# Patient Record
Sex: MALE | Race: WHITE | Employment: FULL TIME | URBAN - NONMETROPOLITAN AREA
[De-identification: names, ages, dates, MRNs, and addresses within clinical notes are randomized per-mention and may not be internally consistent; named-entity substitution may affect disease eponyms.]

---

## 2018-08-14 ENCOUNTER — OFFICE VISIT (OUTPATIENT)
Dept: FAMILY MEDICINE CLINIC | Age: 53
End: 2018-08-14
Payer: COMMERCIAL

## 2018-08-14 VITALS
HEART RATE: 80 BPM | SYSTOLIC BLOOD PRESSURE: 118 MMHG | BODY MASS INDEX: 26.55 KG/M2 | DIASTOLIC BLOOD PRESSURE: 62 MMHG | TEMPERATURE: 98 F | HEIGHT: 72 IN | WEIGHT: 196 LBS

## 2018-08-14 DIAGNOSIS — H81.11 BENIGN PAROXYSMAL POSITIONAL VERTIGO OF RIGHT EAR: Primary | ICD-10-CM

## 2018-08-14 DIAGNOSIS — R53.83 FATIGUE, UNSPECIFIED TYPE: ICD-10-CM

## 2018-08-14 DIAGNOSIS — H61.23 CERUMEN DEBRIS ON TYMPANIC MEMBRANE OF BOTH EARS: ICD-10-CM

## 2018-08-14 PROCEDURE — 1036F TOBACCO NON-USER: CPT | Performed by: FAMILY MEDICINE

## 2018-08-14 PROCEDURE — 99214 OFFICE O/P EST MOD 30 MIN: CPT | Performed by: FAMILY MEDICINE

## 2018-08-14 PROCEDURE — G8427 DOCREV CUR MEDS BY ELIG CLIN: HCPCS | Performed by: FAMILY MEDICINE

## 2018-08-14 PROCEDURE — G8419 CALC BMI OUT NRM PARAM NOF/U: HCPCS | Performed by: FAMILY MEDICINE

## 2018-08-14 PROCEDURE — 3017F COLORECTAL CA SCREEN DOC REV: CPT | Performed by: FAMILY MEDICINE

## 2018-08-14 ASSESSMENT — PATIENT HEALTH QUESTIONNAIRE - PHQ9
SUM OF ALL RESPONSES TO PHQ9 QUESTIONS 1 & 2: 0
SUM OF ALL RESPONSES TO PHQ QUESTIONS 1-9: 0
SUM OF ALL RESPONSES TO PHQ QUESTIONS 1-9: 0
2. FEELING DOWN, DEPRESSED OR HOPELESS: 0
1. LITTLE INTEREST OR PLEASURE IN DOING THINGS: 0

## 2018-08-14 ASSESSMENT — ENCOUNTER SYMPTOMS: NAUSEA: 1

## 2018-08-14 NOTE — PROGRESS NOTES
[facility]  [address] [facility]  Dept: [phone]  Dept Fax: [phone]      Ute Owens is a 48 y.o. male who presents today for his medical conditions/complaints as noted below. Chief Complaint   Patient presents with    Dizziness       HPI:     HPI  Pt noting couple months of dizziness, initially noted with rolling in bed and severe imbalance. Has improved though intermittantly still recurring. Some nausea noted with it also    No fevers noted. Current Outpatient Prescriptions   Medication Sig Dispense Refill    omeprazole (PRILOSEC) 40 MG capsule Take 40 mg by mouth daily       No current facility-administered medications for this visit. No Known Allergies    Past Medical History:   Diagnosis Date    Duodenal ulcer     Peripheral vascular disease ([facility] [address]     Schatzki's ring     Umbilical hernia      Past Surgical History:   Procedure Laterality Date    UPPER GASTROINTESTINAL ENDOSCOPY  01/2015     Social History     Social History    Marital status:      Spouse name: N/A    Number of children: N/A    Years of education: N/A     Occupational History    Not on file. Social History Main Topics    Smoking status: Former Smoker    Smokeless tobacco: Never Used    Alcohol use No    Drug use: No    Sexual activity: Not on file     Other Topics Concern    Not on file     Social History Narrative    No narrative on file     Family History   Problem Relation Age of Onset    Lung Cancer Maternal Grandfather     Diabetes Father     Prostate Cancer Father     Other Son         epilepsy       Subjective:      Review of Systems   Constitutional: Positive for appetite change (when dizzy he does not eat as much) and fatigue (more than normal since dizziness started). HENT: Positive for tinnitus (once in a while). Negative for ear pain. Gastrointestinal: Positive for nausea (at times).    Neurological: Positive for dizziness and light-headedness (Some days). On and off for past couple of months, started when in bed he rolled from one side to the other, then tried to sit up and stand up, he could not balance at first, ever since he has had dizzy spells, much worse when on his right side in bed. pt reports controlling own anxiety symptoms much better than prior. Objective:     /62   Pulse 80   Temp 98 °F (36.7 °C)   Ht 6' (1.829 m)   Wt 196 lb (88.9 kg)   BMI 26.58 kg/m²     Physical Exam   Constitutional: He is oriented to person, place, and time. He appears well-developed and well-nourished. No distress. HENT:   Cerumen bilaterally 90 % occluded   Cardiovascular: Normal rate. No murmur heard. Pulmonary/Chest: Effort normal and breath sounds normal.   Musculoskeletal: He exhibits no edema. Neurological: He is alert and oriented to person, place, and time. Assessment:      Diagnosis Orders   1. Benign paroxysmal positional vertigo of right ear     2. Cerumen debris on tympanic membrane of both ears     3. Fatigue, unspecified type       No results found for this visit on 08/14/18. Plan:     No Follow-up on file. Patient Instructions   Encourage debrox daily x 2 weeks then 2 times weekly  REviewed extinction or provacative maneuvers with patient or may call for PT referral if worsens. If fatigue persisting beyond 2-3 months would consider evaluation of thyroid and blood counts. May call for TSH, CBC and testosterone evaluation if symptoms persisting. Reviewed need to increase fluids. No orders of the defined types were placed in this encounter. No orders of the defined types were placed in this encounter.      Electronically signed by Kane Rodrigues MD on 8/14/2018 at 11:37 AM

## 2018-10-15 ENCOUNTER — OFFICE VISIT (OUTPATIENT)
Dept: FAMILY MEDICINE CLINIC | Age: 53
End: 2018-10-15
Payer: COMMERCIAL

## 2018-10-15 VITALS
BODY MASS INDEX: 26.31 KG/M2 | SYSTOLIC BLOOD PRESSURE: 130 MMHG | HEART RATE: 64 BPM | WEIGHT: 194 LBS | TEMPERATURE: 98 F | DIASTOLIC BLOOD PRESSURE: 88 MMHG

## 2018-10-15 DIAGNOSIS — H61.23 BILATERAL IMPACTED CERUMEN: Primary | ICD-10-CM

## 2018-10-15 DIAGNOSIS — Z23 NEED FOR IMMUNIZATION AGAINST INFLUENZA: ICD-10-CM

## 2018-10-15 PROCEDURE — 99213 OFFICE O/P EST LOW 20 MIN: CPT | Performed by: FAMILY MEDICINE

## 2018-10-15 PROCEDURE — 1036F TOBACCO NON-USER: CPT | Performed by: FAMILY MEDICINE

## 2018-10-15 PROCEDURE — 69210 REMOVE IMPACTED EAR WAX UNI: CPT | Performed by: FAMILY MEDICINE

## 2018-10-15 PROCEDURE — G8427 DOCREV CUR MEDS BY ELIG CLIN: HCPCS | Performed by: FAMILY MEDICINE

## 2018-10-15 PROCEDURE — G8419 CALC BMI OUT NRM PARAM NOF/U: HCPCS | Performed by: FAMILY MEDICINE

## 2018-10-15 PROCEDURE — 3017F COLORECTAL CA SCREEN DOC REV: CPT | Performed by: FAMILY MEDICINE

## 2018-10-15 PROCEDURE — 90471 IMMUNIZATION ADMIN: CPT | Performed by: FAMILY MEDICINE

## 2018-10-15 PROCEDURE — 90686 IIV4 VACC NO PRSV 0.5 ML IM: CPT | Performed by: FAMILY MEDICINE

## 2018-10-15 PROCEDURE — G8482 FLU IMMUNIZE ORDER/ADMIN: HCPCS | Performed by: FAMILY MEDICINE

## 2018-10-16 ASSESSMENT — ENCOUNTER SYMPTOMS
TROUBLE SWALLOWING: 0
RESPIRATORY NEGATIVE: 1
SORE THROAT: 1
SINUS PAIN: 0

## 2018-10-16 NOTE — PROGRESS NOTES
DTaP/Tdap/Td vaccine (2 - Td) 04/22/2023    Flu vaccine  Completed       Lab Results   Component Value Date    HCT 45.7 10/28/2013    No results found for: CHOL, TRIG, HDL, LDLCHOLESTEROL    Subjective:      Review of Systems   Constitutional: Negative for activity change, fatigue and fever. HENT: Positive for congestion, hearing loss and sore throat. Negative for ear discharge, ear pain, sinus pain, tinnitus and trouble swallowing. Respiratory: Negative. Cardiovascular: Negative. Objective:     /88   Pulse 64   Temp 98 °F (36.7 °C)   Wt 194 lb (88 kg)   BMI 26.31 kg/m²     Physical Exam   Constitutional: No distress. HENT:   Left Ear: Hearing normal. Tympanic membrane is not retracted. No middle ear effusion. Ears:    Neck: Normal range of motion. Cardiovascular: Normal rate, regular rhythm, S1 normal, S2 normal and normal heart sounds. No murmur heard. Pulmonary/Chest: Effort normal and breath sounds normal. He has no wheezes. He has no rhonchi. He has no rales. Cerumen Impaction  He noticed the symptoms in the right ear. Wax was impacted and removed by  Lavage. Wax was completely Patient instructed not to use Q-Tips. Tolerated well. TMs normal.  Lr does not lateralize. air Conduction greater than bone conduction      Assessment:      Diagnosis Orders   1. Bilateral impacted cerumen     2. Need for immunization against influenza  INFLUENZA, QUADV, 3 YRS AND OLDER, IM, PF, PREFILL SYR OR SDV, 0.5ML (FLUZONE QUADV, PF)              POC Testing Results (If Applicable):  No results found for this visit on 10/15/18. Plan:         Orders Given:  Orders Placed This Encounter   Procedures    INFLUENZA, QUADV, 3 YRS AND OLDER, IM, PF, PREFILL SYR OR SDV, 0.5ML (FLUZONE QUADV, PF)     Prescriptions:    No orders of the defined types were placed in this encounter. Return if symptoms worsen or fail to improve.       Electronically signed by Cruzito Navarro MD

## 2020-10-21 ENCOUNTER — OFFICE VISIT (OUTPATIENT)
Dept: FAMILY MEDICINE CLINIC | Age: 55
End: 2020-10-21
Payer: COMMERCIAL

## 2020-10-21 VITALS
DIASTOLIC BLOOD PRESSURE: 72 MMHG | SYSTOLIC BLOOD PRESSURE: 116 MMHG | BODY MASS INDEX: 26.79 KG/M2 | HEART RATE: 84 BPM | HEIGHT: 72 IN | OXYGEN SATURATION: 98 % | WEIGHT: 197.8 LBS

## 2020-10-21 PROCEDURE — 90686 IIV4 VACC NO PRSV 0.5 ML IM: CPT | Performed by: FAMILY MEDICINE

## 2020-10-21 PROCEDURE — G8482 FLU IMMUNIZE ORDER/ADMIN: HCPCS | Performed by: FAMILY MEDICINE

## 2020-10-21 PROCEDURE — 90471 IMMUNIZATION ADMIN: CPT | Performed by: FAMILY MEDICINE

## 2020-10-21 PROCEDURE — 99396 PREV VISIT EST AGE 40-64: CPT | Performed by: FAMILY MEDICINE

## 2020-10-21 ASSESSMENT — ENCOUNTER SYMPTOMS
WHEEZING: 0
ABDOMINAL PAIN: 0
SHORTNESS OF BREATH: 0
CONSTIPATION: 0
COUGH: 0
DIARRHEA: 0

## 2020-10-21 ASSESSMENT — PATIENT HEALTH QUESTIONNAIRE - PHQ9
SUM OF ALL RESPONSES TO PHQ QUESTIONS 1-9: 0
1. LITTLE INTEREST OR PLEASURE IN DOING THINGS: 0
SUM OF ALL RESPONSES TO PHQ QUESTIONS 1-9: 0
2. FEELING DOWN, DEPRESSED OR HOPELESS: 0
SUM OF ALL RESPONSES TO PHQ QUESTIONS 1-9: 0
SUM OF ALL RESPONSES TO PHQ9 QUESTIONS 1 & 2: 0

## 2020-10-21 NOTE — PROGRESS NOTES
[address] [address]  Dept: [phone]  Dept Fax: [phone]    René Cole is a 54 y.o. male who presents today for his medical conditions/complaints as noted below. René Cole c/o of Annual Exam      HPI:     HPI  Pt reports here for wellness evaluation  Father with history of prostate cancer    Some SOB with heavy exertion following stress testing per pt. BP Readings from Last 3 Encounters:   10/21/20 116/72   10/15/18 130/88   18 118/62          (goal 120/80)    Past Medical History:   Diagnosis Date    Duodenal ulcer     Peripheral vascular disease ([facility] [address]     Schatzki's ring     Umbilical hernia       Past Surgical History:   Procedure Laterality Date    INGUINAL HERNIA REPAIR Left 2015    UPPER GASTROINTESTINAL ENDOSCOPY  2015       Family History   Problem Relation Age of Onset    Lung Cancer Maternal Grandfather     Diabetes Father     Prostate Cancer Father 66    Other Son         epilepsy       Social History     Tobacco Use    Smoking status: Former Smoker     Packs/day: 0.50     Years: 15.00     Pack years: 7.50     Last attempt to quit: 10/21/1995     Years since quittin.0    Smokeless tobacco: Never Used   Substance Use Topics    Alcohol use: No      Prior to Visit Medications    Medication Sig Taking?  Authorizing Provider   omeprazole (PRILOSEC) 40 MG capsule Take 40 mg by mouth daily Yes Historical Provider, MD     No Known Allergies    Health Maintenance   Topic Date Due    Hepatitis C screen  1965    HIV screen  1980    Lipid screen  2005    Diabetes screen  2005    Colon cancer screen colonoscopy  2015    Flu vaccine (1) 2020    Shingles Vaccine (1 of 2) 2025 (Originally 2015)    DTaP/Tdap/Td vaccine (2 - Td) 2023    Hepatitis A vaccine  Aged Out    Hepatitis B vaccine  Aged Out    Hib vaccine  Aged Out    Meningococcal (ACWY) vaccine  Aged Out    Pneumococcal 0-64 years Vaccine  Aged Out       Subjective:      Review of Systems   Constitutional: Negative for fatigue and fever. Respiratory: Negative for cough, shortness of breath and wheezing. Cardiovascular: Negative for chest pain, palpitations and leg swelling. Gastrointestinal: Negative for abdominal pain, constipation and diarrhea. Genitourinary: Negative for frequency and urgency. Neurological: Negative for dizziness and headaches. Objective:     /72   Pulse 84   Ht 6' (1.829 m)   Wt 197 lb 12.8 oz (89.7 kg)   SpO2 98%   BMI 26.83 kg/m²     Physical Exam  Vitals signs reviewed. Constitutional:       General: He is not in acute distress. Appearance: He is well-developed. HENT:      Head: Atraumatic. Eyes:      Conjunctiva/sclera: Conjunctivae normal.   Neck:      Musculoskeletal: Neck supple. Thyroid: No thyromegaly. Vascular: No carotid bruit. Cardiovascular:      Rate and Rhythm: Normal rate and regular rhythm. Heart sounds: No murmur. Pulmonary:      Effort: Pulmonary effort is normal.      Breath sounds: Normal breath sounds. Abdominal:      General: Bowel sounds are normal.      Palpations: Abdomen is soft. Musculoskeletal:         General: No swelling (BLE). Right lower leg: He exhibits no swelling. Left lower leg: He exhibits no swelling. Lymphadenopathy:      Cervical: No cervical adenopathy. Neurological:      Mental Status: He is alert and oriented to person, place, and time. Psychiatric:         Mood and Affect: Mood normal.         Thought Content: Thought content normal.         Judgment: Judgment normal.         Assessment:     1. Well adult exam    2. Need for influenza vaccination    3. Screening, lipid    4. Screening for diabetes mellitus    5. Screen for colon cancer    6.  Encounter for hepatitis C screening test for low risk patient      No results found for this visit on 10/21/20. Plan:     Orders Placed This Encounter   Procedures    Cologuard     This test is performed by an external laboratory and is used for result attachment only. Please fill out the appropriate paperwork required by the processing laboratory. See [email or web address] ([email or web address]) for further information. Standing Status:   Future     Standing Expiration Date:   10/21/2021    INFLUENZA, QUADV, 3 YRS AND OLDER, IM PF, PREFILL SYR OR SDV, 0.5ML (AFLURIA QUADV, PF)    Lipid Panel     Standing Status:   Future     Standing Expiration Date:   10/21/2021     Order Specific Question:   Is Patient Fasting?/# of Hours     Answer:   10-12    Glucose, Fasting     Standing Status:   Future     Standing Expiration Date:   10/21/2021    Hepatitis C Antibody     Standing Status:   Future     Standing Expiration Date:   10/21/2021        Patient counseled on PSA testing including pros and cons, including need to further evaluate if abnormal. Discussed treatments and outcomes with pt who prefers not to test following review      Return in about 2 years (around 10/21/2022) for well adult. There are no Patient Instructions on file for this visit. Discussed use, benefit, and side effects of prescribed medications. All patient questions answered. Pt voiced understanding. Reviewed health maintenance flu vaccine desired, reviewed Hep C, lipid, DM screen and colon cancer screening. Instructed to continue current medications, diet and exercise. Patient agreed with treatment plan. Follow up as directed.      Electronically signed by Adela Tucker MD on 10/21/2020

## 2020-10-26 ENCOUNTER — TELEPHONE (OUTPATIENT)
Dept: FAMILY MEDICINE CLINIC | Age: 55
End: 2020-10-26

## 2020-10-26 NOTE — TELEPHONE ENCOUNTER
As per note pt aware of pros and cons of testing. Order as desired. Mail  or mail test requested.   Thanks

## 2020-10-26 NOTE — TELEPHONE ENCOUNTER
Patient would like to get PSA levels checked. Duane stated his daughter told him he needed to have them checked.

## 2020-10-28 ENCOUNTER — OFFICE VISIT (OUTPATIENT)
Dept: FAMILY MEDICINE CLINIC | Age: 55
End: 2020-10-28
Payer: COMMERCIAL

## 2020-10-28 ENCOUNTER — HOSPITAL ENCOUNTER (OUTPATIENT)
Age: 55
Setting detail: SPECIMEN
Discharge: HOME | End: 2020-10-28
Payer: COMMERCIAL

## 2020-10-28 LAB — GLUCOSE FASTING: 99 MG/DL (ref 70–99)

## 2020-10-28 PROCEDURE — 80061 LIPID PANEL: CPT

## 2020-10-28 PROCEDURE — 36415 COLL VENOUS BLD VENIPUNCTURE: CPT | Performed by: FAMILY MEDICINE

## 2020-10-28 PROCEDURE — G0103 PSA SCREENING: HCPCS | Performed by: FAMILY MEDICINE

## 2020-10-28 PROCEDURE — 80061 LIPID PANEL: CPT | Performed by: FAMILY MEDICINE

## 2020-10-28 PROCEDURE — 82951 GLUCOSE TOLERANCE TEST (GTT): CPT | Performed by: FAMILY MEDICINE

## 2020-10-28 PROCEDURE — 82947 ASSAY GLUCOSE BLOOD QUANT: CPT

## 2020-10-28 PROCEDURE — G0103 PSA SCREENING: HCPCS

## 2020-10-28 PROCEDURE — 86803 HEPATITIS C AB TEST: CPT

## 2020-10-29 LAB
CHOLESTEROL/HDL RATIO: 5.3
CHOLESTEROL: 206 MG/DL
HDLC SERPL-MCNC: 39 MG/DL
HEPATITIS C ANTIBODY: NONREACTIVE
LDL CHOLESTEROL: 142 MG/DL (ref 0–130)
PROSTATE SPECIFIC ANTIGEN: 1.91 UG/L
TRIGL SERPL-MCNC: 123 MG/DL
VLDLC SERPL CALC-MCNC: ABNORMAL MG/DL (ref 1–30)

## 2020-11-11 ENCOUNTER — OFFICE VISIT (OUTPATIENT)
Dept: FAMILY MEDICINE CLINIC | Age: 55
End: 2020-11-11
Payer: COMMERCIAL

## 2020-11-11 VITALS
HEART RATE: 61 BPM | WEIGHT: 200 LBS | DIASTOLIC BLOOD PRESSURE: 64 MMHG | OXYGEN SATURATION: 98 % | HEIGHT: 72 IN | TEMPERATURE: 97.4 F | BODY MASS INDEX: 27.09 KG/M2 | SYSTOLIC BLOOD PRESSURE: 118 MMHG

## 2020-11-11 PROBLEM — E78.2 MIXED HYPERLIPIDEMIA: Status: ACTIVE | Noted: 2020-11-11

## 2020-11-11 PROBLEM — E78.6 HDL DEFICIENCY: Status: ACTIVE | Noted: 2020-11-11

## 2020-11-11 PROCEDURE — 99212 OFFICE O/P EST SF 10 MIN: CPT | Performed by: FAMILY MEDICINE

## 2020-11-11 PROCEDURE — G8427 DOCREV CUR MEDS BY ELIG CLIN: HCPCS | Performed by: FAMILY MEDICINE

## 2020-11-11 PROCEDURE — 1036F TOBACCO NON-USER: CPT | Performed by: FAMILY MEDICINE

## 2020-11-11 PROCEDURE — G8482 FLU IMMUNIZE ORDER/ADMIN: HCPCS | Performed by: FAMILY MEDICINE

## 2020-11-11 PROCEDURE — G8419 CALC BMI OUT NRM PARAM NOF/U: HCPCS | Performed by: FAMILY MEDICINE

## 2020-11-11 PROCEDURE — 3017F COLORECTAL CA SCREEN DOC REV: CPT | Performed by: FAMILY MEDICINE

## 2020-11-11 ASSESSMENT — ENCOUNTER SYMPTOMS
COUGH: 0
SINUS PAIN: 0
VOMITING: 0
SHORTNESS OF BREATH: 0
ABDOMINAL PAIN: 0
NAUSEA: 0

## 2020-11-11 NOTE — PATIENT INSTRUCTIONS
trans fat is fine.)  · Replace red meat with fish, poultry, and soy protein (like tofu). · Limit processed and packaged foods like chips, crackers, and cookies. · Bake, broil, or steam foods. Don't malave them. · Be physically active. Get at least 30 minutes of exercise on most days of the week. Walking is a good choice. You also may want to do other activities, such as running, swimming, cycling, or playing tennis or team sports. · Stay at a healthy weight or lose weight by making the changes in eating and physical activity listed above. Losing just a small amount of weight, even 5 to 10 pounds, can reduce your risk for having a heart attack or stroke. · Do not smoke. When should you call for help? Watch closely for changes in your health, and be sure to contact your doctor if:    · You need help making lifestyle changes.     · You have questions about your medicine. Where can you learn more? Go to https://Etopuseb.HealthiNation. org and sign in to your [employer] account. Enter I887 in the MobiPixie box to learn more about \"High Cholesterol: Care Instructions. \"     If you do not have an account, please click on the \"Sign Up Now\" link. Current as of: December 16, 2019               Content Version: 12.6  © 2176-1514 [employer]. Care instructions adapted under license by [facility] ([facility]). If you have questions about a medical condition or this instruction, always ask your healthcare professional. [place] [address] any warranty or liability for your use of this information.

## 2020-11-11 NOTE — PROGRESS NOTES
[address] [address]  Dept: [phone]  Dept Fax: [phone]    Arash Carmona is a 54 y.o. male who presents today for his medical conditions/complaints as noted below. Arash Carmona c/o of Results      HPI:     HPI  Pt here to review recent lab results, prefers to discuss prior to next follow up    BP Readings from Last 3 Encounters:   20 118/64   10/21/20 116/72   10/15/18 130/88          (goal 120/80)    Past Medical History:   Diagnosis Date    Duodenal ulcer     Peripheral vascular disease ([address]     Schatzki's ring     Umbilical hernia       Past Surgical History:   Procedure Laterality Date    INGUINAL HERNIA REPAIR Left 2015    UPPER GASTROINTESTINAL ENDOSCOPY  2015       Family History   Problem Relation Age of Onset    Lung Cancer Maternal Grandfather     Diabetes Father     Prostate Cancer Father 66    Other Son         epilepsy       Social History     Tobacco Use    Smoking status: Former Smoker     Packs/day: 0.50     Years: 15.00     Pack years: 7.50     Last attempt to quit: 10/21/1995     Years since quittin.0    Smokeless tobacco: Never Used   Substance Use Topics    Alcohol use: No      Prior to Visit Medications    Medication Sig Taking?  Authorizing Provider   omeprazole (PRILOSEC) 40 MG capsule Take 40 mg by mouth daily  Historical Provider, MD     No Known Allergies    Health Maintenance   Topic Date Due    HIV screen  1980    Shingles Vaccine (1 of 2) 2025 (Originally 2015)    DTaP/Tdap/Td vaccine (2 - Td) 2023    Diabetes screen  10/28/2023    Colon cancer screen fecal DNA test (Cologuard)  2023    Lipid screen  10/28/2025    Flu vaccine  Completed    Hepatitis C screen  Completed    Hepatitis A vaccine  Aged Out    Hepatitis B vaccine  Aged Out    Hib vaccine  Aged Out    Meningococcal (ACWY) vaccine  Aged Out    Pneumococcal 0-64 years Vaccine  Aged [place] Subjective:      Review of Systems   Constitutional: Negative for activity change, appetite change and fatigue. HENT: Negative for congestion and sinus pain. Eyes: Negative for visual disturbance. Respiratory: Negative for cough and shortness of breath. Cardiovascular: Negative for chest pain, palpitations and leg swelling. Gastrointestinal: Negative for abdominal pain, nausea and vomiting. Genitourinary: Negative for dysuria and frequency. Musculoskeletal: Negative for arthralgias and myalgias. Neurological: Negative for dizziness. Psychiatric/Behavioral: Negative for confusion and sleep disturbance. The patient is not nervous/anxious. Objective:     /64 (Site: Left Upper Arm, Position: Sitting, Cuff Size: Small Adult)   Pulse 61   Temp 97.4 °F (36.3 °C) (Temporal)   Ht 6' (1.829 m)   Wt 200 lb (90.7 kg)   SpO2 98%   BMI 27.12 kg/m²     Physical Exam  Vitals signs reviewed. Constitutional:       General: He is not in acute distress. Appearance: He is well-developed. He is not ill-appearing. HENT:      Head: Atraumatic. Eyes:      Conjunctiva/sclera: Conjunctivae normal.   Neck:      Thyroid: No thyromegaly. Cardiovascular:      Rate and Rhythm: Normal rate. Heart sounds: No murmur. Musculoskeletal:         General: No swelling (BLE). Right lower leg: He exhibits no swelling. Left lower leg: He exhibits no swelling. Neurological:      Mental Status: He is alert and oriented to person, place, and time.        Lab Results   Component Value Date    PSA 1.91 10/28/2020     Lab Results   Component Value Date    CHOL 206 (H) 10/28/2020     Lab Results   Component Value Date    TRIG 123 10/28/2020     Lab Results   Component Value Date    HDL 39 (L) 10/28/2020     Lab Results   Component Value Date    LDLCHOLESTEROL 142 (H) 10/28/2020     Lab Results   Component Value Date    VLDL NOT REPORTED 10/28/2020     Lab Results   Component Value Date CHOLHDLRATIO 5.3 (H) 10/28/2020     Glucose 99 noted  The 10-year ASCVD risk score (Virginia Neves et al., 2013) is: 6.3%    Values used to calculate the score:      Age: 54 years      Sex: Male      Is Non- : No      Diabetic: No      Tobacco smoker: No      Systolic Blood Pressure: 786 mmHg      Is BP treated: No      HDL Cholesterol: 39 mg/dL      Total Cholesterol: 206 mg/dL   cologuard negative    Assessment:     1. Mixed hyperlipidemia    2. HDL deficiency      No results found for this visit on 11/11/20. Plan:     Orders Placed This Encounter   Procedures    Lipid Panel     Standing Status:   Future     Standing Expiration Date:   11/11/2021     Order Specific Question:   Is Patient Fasting?/# of Hours     Answer:   10-12       Return for as planned annually. Patient Instructions     Patient Education   Increase exercise for elevation of good cholesterol ( HDL)  Diet changes to improve LDL (bad cholesterol)  Repeat testing in 6-12 months to reevaluate       High Cholesterol: Care Instructions  Your Care Instructions     Cholesterol is a type of fat in your blood. It is needed for many body functions, such as making new cells. Cholesterol is made by your body. It also comes from food you eat. High cholesterol means that you have too much of the fat in your blood. This raises your risk of a heart attack and stroke. LDL and HDL are part of your total cholesterol. LDL is the \"bad\" cholesterol. High LDL can raise your risk for heart disease, heart attack, and stroke. HDL is the \"good\" cholesterol. It helps clear bad cholesterol from the body. High HDL is linked with a lower risk of heart disease, heart attack, and stroke. Your cholesterol levels help your doctor find out your risk for having a heart attack or stroke. You and your doctor can talk about whether you need to lower your risk and what treatment is best for you.   A heart-healthy lifestyle along with medicines can help lower your cholesterol and your risk. The way you choose to lower your risk will depend on how high your risk is for heart attack and stroke. It will also depend on how you feel about taking medicines. Follow-up care is a key part of your treatment and safety. Be sure to make and go to all appointments, and call your doctor if you are having problems. It's also a good idea to know your test results and keep a list of the medicines you take. How can you care for yourself at home? · Eat a variety of foods every day. Good choices include fruits, vegetables, whole grains (like oatmeal), dried beans and peas, nuts and seeds, soy products (like tofu), and fat-free or low-fat dairy products. · Replace butter, margarine, and hydrogenated or partially hydrogenated oils with olive and canola oils. (Canola oil margarine without trans fat is fine.)  · Replace red meat with fish, poultry, and soy protein (like tofu). · Limit processed and packaged foods like chips, crackers, and cookies. · Bake, broil, or steam foods. Don't malave them. · Be physically active. Get at least 30 minutes of exercise on most days of the week. Walking is a good choice. You also may want to do other activities, such as running, swimming, cycling, or playing tennis or team sports. · Stay at a healthy weight or lose weight by making the changes in eating and physical activity listed above. Losing just a small amount of weight, even 5 to 10 pounds, can reduce your risk for having a heart attack or stroke. · Do not smoke. When should you call for help? Watch closely for changes in your health, and be sure to contact your doctor if:    · You need help making lifestyle changes.     · You have questions about your medicine. Where can you learn more? Go to https://chpepiceweb.Mi-Pay. org and sign in to your [email or web address] account. Enter Y125 in the Spotster box to learn more about \"High Cholesterol: Care Instructions. \"     If you do not have an account, please click on the \"Sign Up Now\" link. Current as of: December 16, 2019               Content Version: 12.6  © 3060-0583 [employer]. Care instructions adapted under license by [facility] ([facility]). If you have questions about a medical condition or this instruction, always ask your healthcare professional. [place] [address] any warranty or liability for your use of this information. Discussed use, benefit, and side effects of prescribed medications. All patient questions answered. Pt voiced understanding. Instructed to continue current medications, diet and exercise. Patient agreed with treatment plan. Follow up as directed.      Electronically signed by Talha Araujo MD on 11/11/2020

## 2021-05-04 ENCOUNTER — OFFICE VISIT (OUTPATIENT)
Dept: FAMILY MEDICINE CLINIC | Age: 56
End: 2021-05-04
Payer: COMMERCIAL

## 2021-05-04 VITALS
DIASTOLIC BLOOD PRESSURE: 82 MMHG | TEMPERATURE: 98.3 F | SYSTOLIC BLOOD PRESSURE: 128 MMHG | WEIGHT: 196.7 LBS | OXYGEN SATURATION: 98 % | BODY MASS INDEX: 26.68 KG/M2 | HEART RATE: 88 BPM

## 2021-05-04 DIAGNOSIS — M25.562 ACUTE PAIN OF LEFT KNEE: Primary | ICD-10-CM

## 2021-05-04 PROCEDURE — 3017F COLORECTAL CA SCREEN DOC REV: CPT | Performed by: FAMILY MEDICINE

## 2021-05-04 PROCEDURE — 1036F TOBACCO NON-USER: CPT | Performed by: FAMILY MEDICINE

## 2021-05-04 PROCEDURE — G8419 CALC BMI OUT NRM PARAM NOF/U: HCPCS | Performed by: FAMILY MEDICINE

## 2021-05-04 PROCEDURE — 99213 OFFICE O/P EST LOW 20 MIN: CPT | Performed by: FAMILY MEDICINE

## 2021-05-04 PROCEDURE — G8427 DOCREV CUR MEDS BY ELIG CLIN: HCPCS | Performed by: FAMILY MEDICINE

## 2021-05-04 RX ORDER — CELECOXIB 200 MG/1
200 CAPSULE ORAL DAILY
Qty: 30 CAPSULE | Refills: 1 | Status: SHIPPED | OUTPATIENT
Start: 2021-05-04 | End: 2021-05-04 | Stop reason: SDUPTHER

## 2021-05-04 RX ORDER — CELECOXIB 200 MG/1
200 CAPSULE ORAL DAILY
Qty: 30 CAPSULE | Refills: 1 | Status: SHIPPED | OUTPATIENT
Start: 2021-05-04

## 2021-05-04 ASSESSMENT — PATIENT HEALTH QUESTIONNAIRE - PHQ9
2. FEELING DOWN, DEPRESSED OR HOPELESS: 0
SUM OF ALL RESPONSES TO PHQ QUESTIONS 1-9: 0
1. LITTLE INTEREST OR PLEASURE IN DOING THINGS: 0

## 2021-05-04 NOTE — PROGRESS NOTES
[address] [facility]  Dept: [phone]  Dept Airam Mei is a 54 y.o. male who presents today for his medical conditions/complaints as noted below. Chief Complaint   Patient presents with    Knee Pain     left knee pain aching x2 months       HPI:     HPI  Pt here for evaluation of left knee pain for past 2 months. More severe past week after bending and squatting more throughout the day. Patient reports he has been having trouble with walking regularly and noticing some joint swelling. No known injury was noted initially. Had problems in sports in youth only. Patient has been taking no medications regularly to try and help with this. Rare ibuprofen without relief  No prior surgery or therapy has been attempted. Pain on inside and noting tight sensation on back on knee. Prior to Visit Medications    Medication Sig Taking?  Authorizing Provider   omeprazole (PRILOSEC) 40 MG capsule Take 40 mg by mouth daily Yes Historical Provider, MD       No Known Allergies    Past Medical History:   Diagnosis Date    Duodenal ulcer     Peripheral vascular disease ([facility] [address]     Schatzki's ring     Umbilical hernia      Past Surgical History:   Procedure Laterality Date    INGUINAL HERNIA REPAIR Left 2015    UPPER GASTROINTESTINAL ENDOSCOPY  01/2015     Social History     Socioeconomic History    Marital status:      Spouse name: Not on file    Number of children: Not on file    Years of education: Not on file    Highest education level: Not on file   Occupational History    Not on file   Social Needs    Financial resource strain: Not on file    Food insecurity     Worry: Not on file     Inability: Not on file   [employer] needs     Medical: Not on file     Non-medical: Not on file   Tobacco Use    Smoking status: Former Smoker     Packs/day: 0.50     Years: 15.00     Pack years: 7.50     Quit date: 10/21/1995     Years since quittin.5    Smokeless tobacco: Never Used   Substance and Sexual Activity    Alcohol use: No    Drug use: No    Sexual activity: Not on file   Lifestyle    Physical activity     Days per week: Not on file     Minutes per session: Not on file    Stress: Not on file   Relationships    Social connections     Talks on phone: Not on file     Gets together: Not on file     Attends [employer] service: Not on file     Active member of club or organization: Not on file     Attends meetings of clubs or organizations: Not on file     Relationship status: Not on file    Intimate partner violence     Fear of current or ex partner: Not on file     Emotionally abused: Not on file     Physically abused: Not on file     Forced sexual activity: Not on file   Other Topics Concern    Not on file   Social History Narrative    Not on file     Family History   Problem Relation Age of Onset    Lung Cancer Maternal Grandfather     Diabetes Father     Prostate Cancer Father 66    Other Son         epilepsy       Subjective:      Review of Systems   Musculoskeletal: Positive for gait problem and joint swelling. Left knee swelling/pain       Objective:     /82 (Site: Left Upper Arm, Position: Sitting)   Pulse 88   Temp 98.3 °F (36.8 °C) (Tympanic)   Wt 196 lb 11.2 oz (89.2 kg)   SpO2 98%   BMI 26.68 kg/m²     Physical Exam  Vitals signs reviewed. HENT:      Head: Normocephalic. Eyes:      Conjunctiva/sclera: Conjunctivae normal.   Cardiovascular:      Rate and Rhythm: Normal rate. Pulmonary:      Effort: Pulmonary effort is normal. No respiratory distress. Musculoskeletal:         General: Tenderness (medial left knee JLT no superior or inferior tenderness. Normal right knee) present. No deformity. Neurological:      Mental Status: He is alert. Psychiatric:         Thought Content:  Thought content normal.         Judgment: Judgment normal.         Assessment:

## 2022-06-02 ENCOUNTER — OFFICE VISIT (OUTPATIENT)
Dept: FAMILY MEDICINE CLINIC | Age: 57
End: 2022-06-02
Payer: COMMERCIAL

## 2022-06-02 VITALS
SYSTOLIC BLOOD PRESSURE: 116 MMHG | DIASTOLIC BLOOD PRESSURE: 84 MMHG | OXYGEN SATURATION: 98 % | TEMPERATURE: 99 F | RESPIRATION RATE: 14 BRPM | HEIGHT: 74 IN | BODY MASS INDEX: 24.79 KG/M2 | HEART RATE: 73 BPM | WEIGHT: 193.2 LBS

## 2022-06-02 DIAGNOSIS — J40 BRONCHITIS: Primary | ICD-10-CM

## 2022-06-02 DIAGNOSIS — H61.23 BILATERAL IMPACTED CERUMEN: ICD-10-CM

## 2022-06-02 PROCEDURE — G8427 DOCREV CUR MEDS BY ELIG CLIN: HCPCS | Performed by: NURSE PRACTITIONER

## 2022-06-02 PROCEDURE — 69209 REMOVE IMPACTED EAR WAX UNI: CPT | Performed by: NURSE PRACTITIONER

## 2022-06-02 PROCEDURE — 99213 OFFICE O/P EST LOW 20 MIN: CPT | Performed by: NURSE PRACTITIONER

## 2022-06-02 PROCEDURE — 3017F COLORECTAL CA SCREEN DOC REV: CPT | Performed by: NURSE PRACTITIONER

## 2022-06-02 PROCEDURE — G8420 CALC BMI NORM PARAMETERS: HCPCS | Performed by: NURSE PRACTITIONER

## 2022-06-02 PROCEDURE — 1036F TOBACCO NON-USER: CPT | Performed by: NURSE PRACTITIONER

## 2022-06-02 RX ORDER — ALBUTEROL SULFATE 90 UG/1
2 AEROSOL, METERED RESPIRATORY (INHALATION) EVERY 6 HOURS PRN
Qty: 18 G | Refills: 3 | Status: SHIPPED | OUTPATIENT
Start: 2022-06-02

## 2022-06-02 RX ORDER — AZITHROMYCIN 250 MG/1
TABLET, FILM COATED ORAL
Qty: 1 PACKET | Refills: 0 | Status: SHIPPED | OUTPATIENT
Start: 2022-06-02 | End: 2022-06-07

## 2022-06-02 SDOH — ECONOMIC STABILITY: FOOD INSECURITY: WITHIN THE PAST 12 MONTHS, THE FOOD YOU BOUGHT JUST DIDN'T LAST AND YOU DIDN'T HAVE MONEY TO GET MORE.: NEVER TRUE

## 2022-06-02 SDOH — ECONOMIC STABILITY: FOOD INSECURITY: WITHIN THE PAST 12 MONTHS, YOU WORRIED THAT YOUR FOOD WOULD RUN OUT BEFORE YOU GOT MONEY TO BUY MORE.: NEVER TRUE

## 2022-06-02 ASSESSMENT — PATIENT HEALTH QUESTIONNAIRE - PHQ9
2. FEELING DOWN, DEPRESSED OR HOPELESS: 0
SUM OF ALL RESPONSES TO PHQ9 QUESTIONS 1 & 2: 0
SUM OF ALL RESPONSES TO PHQ QUESTIONS 1-9: 0
1. LITTLE INTEREST OR PLEASURE IN DOING THINGS: 0
SUM OF ALL RESPONSES TO PHQ QUESTIONS 1-9: 0

## 2022-06-02 ASSESSMENT — ENCOUNTER SYMPTOMS
SORE THROAT: 0
SINUS COMPLAINT: 1
WHEEZING: 0
SINUS PRESSURE: 0
DIARRHEA: 0
RHINORRHEA: 1
NAUSEA: 0
VOMITING: 0
COUGH: 1

## 2022-06-02 ASSESSMENT — SOCIAL DETERMINANTS OF HEALTH (SDOH): HOW HARD IS IT FOR YOU TO PAY FOR THE VERY BASICS LIKE FOOD, HOUSING, MEDICAL CARE, AND HEATING?: NOT HARD AT ALL

## 2022-06-02 NOTE — PATIENT INSTRUCTIONS
Zithromax as directed. Albuterol 2 puffs every 4 to 6 hours as needed for cough, wheeze or shortness of breath. Follow up with primary care provider in 1 to 2 days if needed. Patient Education        Bronchitis: Care Instructions  Your Care Instructions     Bronchitis is inflammation of the bronchial tubes, which carry air to the lungs. The tubes swell and produce mucus, or phlegm. The mucus and inflamedbronchial tubes make you cough. You may have trouble breathing. Most cases of bronchitis are caused by viruses like those that cause colds. Antibiotics usually do not help and they may be harmful. Bronchitis usually develops rapidly and lasts about 2 to 3 weeks in otherwisehealthy people. Follow-up care is a key part of your treatment and safety. Be sure to make and go to all appointments, and call your doctor if you are having problems. It's also a good idea to know your test results and keep alist of the medicines you take. How can you care for yourself at home?  Take all medicines exactly as prescribed. Call your doctor if you think you are having a problem with your medicine.  Get some extra rest.   Take an over-the-counter pain medicine, such as acetaminophen (Tylenol), ibuprofen (Advil, Motrin), or naproxen (Aleve) to reduce fever and relieve body aches. Read and follow all instructions on the label.  Do not take two or more pain medicines at the same time unless the doctor told you to. Many pain medicines have acetaminophen, which is Tylenol. Too much acetaminophen (Tylenol) can be harmful.  Take an over-the-counter cough medicine to help quiet a dry, hacking cough so that you can sleep. Avoid cough medicines that have more than one active ingredient. Read and follow all instructions on the label.  Do not smoke. Smoking can make bronchitis worse. If you need help quitting, talk to your doctor about stop-smoking programs and medicines.  These can increase your chances of quitting for good.  When should you call for help? Call 911 anytime you think you may need emergency care. For example, call if:     You have severe trouble breathing. Call your doctor now or seek immediate medical care if:     You have new or worse trouble breathing.      You cough up dark brown or bloody mucus (sputum).      You have a new or higher fever.      You have a new rash. Watch closely for changes in your health, and be sure to contact your doctor if:     You cough more deeply or more often, especially if you notice more mucus or a change in the color of your mucus.      You are not getting better as expected. Where can you learn more? Go to https://SeevibespeRithmioeb.Astonish Results. org and sign in to your Auxmoney account. Enter H333 in the [address] box to learn more about \"Bronchitis: Care Instructions. \"     If you do not have an account, please click on the \"Sign Up Now\" link. Current as of: July 6, 2021               Content Version: 13.2  © 2006-2022 [employer]. Care instructions adapted under license by [facility] ([facility]). If you have questions about a medical condition or this instruction, always ask your healthcare professional. [place] [address] any warranty or liability for your use of this information. Patient Education        Earwax Blockage: Care Instructions  Your Care Instructions     Earwax is a natural substance that protects the ear canal. Normally, earwax drains from the ears and does not cause problems. Sometimes earwax builds up and hardens. Earwax blockage (also called cerumen impaction) can cause some loss of hearing and pain. When wax is tightly packed, you will need to haveyour doctor remove it. Follow-up care is a key part of your treatment and safety. Be sure to make and go to all appointments, and call your doctor if you are having problems.  It's also a good idea to know your test results and keep alist of the medicines you take.  How can you care for yourself at home?  Do not try to remove earwax with cotton swabs, fingers, or other objects. This can make the blockage worse and damage the eardrum.  If your doctor recommends that you try to remove earwax at home:  ? Soften and loosen the earwax with warm mineral oil. You also can try hydrogen peroxide mixed with an equal amount of room temperature water. Place 2 drops of the fluid, warmed to body temperature, in the ear two times a day for up to 5 days. ? Once the wax is loose and soft, all that is usually needed to remove it from the ear canal is a gentle, warm shower. Direct the water into the ear, then tip your head to let the earwax drain out. Dry your ear thoroughly with a hair dryer set on low. Hold the dryer several inches from your ear. ? If the warm mineral oil and shower do not work, use an over-the-counter wax softener. Read and follow all instructions on the label. After using the wax softener, use an ear syringe to gently flush the ear. Make sure the flushing solution is body temperature. Cool or hot fluids in the ear can cause dizziness. When should you call for help? Call your doctor now or seek immediate medical care if:     Pus or blood drains from your ear.      Your ears are ringing or feel full.      You have a loss of hearing. Watch closely for changes in your health, and be sure to contact your doctor if:     You have pain or reduced hearing after 1 week of home treatment.      You have any new symptoms, such as nausea or balance problems. Where can you learn more? Go to https://IntelliFlojonaEntellus Medical.Neuren Pharmaceuticals. org and sign in to your [employer] Software account. Enter O154 in the [facility] box to learn more about \"Earwax Blockage: Care Instructions. \"     If you do not have an account, please click on the \"Sign Up Now\" link. Current as of: July 1, 2021               Content Version: 13.2  © 9741-4862 Healthwise, Incorporated.    Care instructions adapted under license by [facility] ([facility]). If you have questions about a medical condition or this instruction, always ask your healthcare professional. [address] any warranty or liability for your use of this information.

## 2022-06-02 NOTE — PROGRESS NOTES
[address] [place] [address] [address] [facility], APRN-[facility]  [address]  Phone:  [phone]  Fax:  [phone]  Xander Buitrago is a 64 y.o. male who presents today for his medical conditions/complaints as noted below. Xander Buitrago c/o of Cough (Pt presents to walkin with complaints of a cold for the past week since 5/25. Pt says he has been experienicing cough, nasal congestion, and runny nose. Pt states he took an at home covid test on5/26 that came back negative. Pt has tried OTC alkaseltzer cold medicing but only provided temporary relief. )      HPI:     Sinus Problem  This is a new problem. The current episode started 1 to 4 weeks ago (8 days). The problem is unchanged. There has been no fever. Associated symptoms include congestion and coughing. Pertinent negatives include no chills, diaphoresis, headaches, sinus pressure or sore throat.        Wt Readings from Last 3 Encounters:   06/02/22 193 lb 3.2 oz (87.6 kg)   05/04/21 196 lb 11.2 oz (89.2 kg)   11/11/20 200 lb (90.7 kg)       Temp Readings from Last 3 Encounters:   06/02/22 99 °F (37.2 °C)   05/04/21 98.3 °F (36.8 °C) (Tympanic)   11/11/20 97.4 °F (36.3 °C) (Temporal)       BP Readings from Last 3 Encounters:   06/02/22 116/84   05/04/21 128/82   11/11/20 118/64       Pulse Readings from Last 3 Encounters:   06/02/22 73   05/04/21 88   11/11/20 61        SpO2 Readings from Last 3 Encounters:   06/02/22 98%   05/04/21 98%   11/11/20 98%             Past Medical History:   Diagnosis Date    Duodenal ulcer     Peripheral vascular disease ([address]     Schatzki's ring     Umbilical hernia       Past Surgical History:   Procedure Laterality Date    INGUINAL HERNIA REPAIR Left 2015    UPPER GASTROINTESTINAL ENDOSCOPY  01/2015     Family History   Problem Relation Age of Onset    Lung Cancer Maternal Grandfather     Diabetes Father     Prostate Cancer Father 66    Other Son         epilepsy     Social History     Tobacco Use  Smoking status: Former Smoker     Packs/day: 0.50     Years: 15.00     Pack years: 7.50     Quit date: 10/21/1995     Years since quittin.6    Smokeless tobacco: Never Used   Substance Use Topics    Alcohol use: No      Current Outpatient Medications   Medication Sig Dispense Refill    azithromycin (ZITHROMAX Z-KIERRA) 250 MG tablet Two pills daily first day, then one pill daily for 4 days. Take with food. 1 packet 0    albuterol sulfate HFA (VENTOLIN HFA) 108 (90 Base) MCG/ACT inhaler Inhale 2 puffs into the lungs every 6 hours as needed for Wheezing 18 g 3    celecoxib (CELEBREX) 200 MG capsule Take 1 capsule by mouth daily 30 capsule 1    omeprazole (PRILOSEC) 40 MG capsule Take 40 mg by mouth daily       No current facility-administered medications for this visit. No Known Allergies    No exam data present    Subjective:      Review of Systems   Constitutional: Negative for chills, diaphoresis, fatigue and fever. HENT: Positive for congestion and rhinorrhea. Negative for sinus pressure and sore throat. Respiratory: Positive for cough. Negative for wheezing. Gastrointestinal: Negative for diarrhea, nausea and vomiting. Musculoskeletal: Negative for arthralgias and myalgias. Neurological: Negative for dizziness and headaches. Objective:     /84 (Site: Right Upper Arm, Position: Sitting, Cuff Size: Medium Adult)   Pulse 73   Temp 99 °F (37.2 °C)   Resp 14   Ht 6' 2\" (1.88 m)   Wt 193 lb 3.2 oz (87.6 kg)   SpO2 98%   BMI 24.81 kg/m²     Physical Exam  Vitals reviewed. Constitutional:       General: He is not in acute distress. Appearance: He is well-developed. He is not ill-appearing, toxic-appearing or diaphoretic. HENT:      Head: Normocephalic. Right Ear: External ear normal. There is impacted cerumen. Left Ear: External ear normal. There is impacted cerumen. Nose: Nose normal. No mucosal edema, congestion or rhinorrhea.       Mouth/Throat: Mouth: Mucous membranes are moist. Mucous membranes are not pale and not dry. Pharynx: Oropharynx is clear. Eyes:      General: Lids are normal. No scleral icterus. Right eye: No discharge. Left eye: No discharge. Extraocular Movements:      Right eye: No nystagmus. Left eye: No nystagmus. Conjunctiva/sclera: Conjunctivae normal.   Neck:      Trachea: Trachea normal.   Cardiovascular:      Rate and Rhythm: Normal rate and regular rhythm. Pulses: Normal pulses. Heart sounds: Normal heart sounds. Pulmonary:      Effort: Pulmonary effort is normal. No accessory muscle usage or respiratory distress. Breath sounds: Examination of the right-middle field reveals rhonchi. Examination of the right-lower field reveals rhonchi. Rhonchi present. Musculoskeletal:         General: Normal range of motion. Cervical back: Full passive range of motion without pain and normal range of motion. Skin:     General: Skin is warm and dry. Capillary Refill: Capillary refill takes less than 2 seconds. Coloration: Skin is not pale. Neurological:      Mental Status: He is alert and oriented to person, place, and time. Psychiatric:         Mood and Affect: Mood normal.         Speech: Speech normal.         Behavior: Behavior normal.         Thought Content: Thought content normal.         Judgment: Judgment normal.         Assessment:      Diagnosis Orders   1. Bronchitis  azithromycin (ZITHROMAX Z-KIERRA) 250 MG tablet    albuterol sulfate HFA (VENTOLIN HFA) 108 (90 Base) MCG/ACT inhaler   2. Bilateral impacted cerumen  MN REMOVAL IMPACTED CERUMEN IRRIGATION/LVG UNILAT     No results found for this visit on 06/02/22. A large amount of cerumen was removed bilaterally via irrigation with warm water, peroxide and alcohol. Patient tolerated procedure well. Plan:       Zithromax as directed.   Albuterol 2 puffs every 4 to 6 hours as needed for cough, wheeze or shortness of breath. Follow up with primary care provider in 1 to 2 days if needed. Patient Instructions     Zithromax as directed. Albuterol 2 puffs every 4 to 6 hours as needed for cough, wheeze or shortness of breath. Follow up with primary care provider in 1 to 2 days if needed. Patient Education        Bronchitis: Care Instructions  Your Care Instructions     Bronchitis is inflammation of the bronchial tubes, which carry air to the lungs. The tubes swell and produce mucus, or phlegm. The mucus and inflamedbronchial tubes make you cough. You may have trouble breathing. Most cases of bronchitis are caused by viruses like those that cause colds. Antibiotics usually do not help and they may be harmful. Bronchitis usually develops rapidly and lasts about 2 to 3 weeks in otherwisehealthy people. Follow-up care is a key part of your treatment and safety. Be sure to make and go to all appointments, and call your doctor if you are having problems. It's also a good idea to know your test results and keep alist of the medicines you take. How can you care for yourself at home?  Take all medicines exactly as prescribed. Call your doctor if you think you are having a problem with your medicine.  Get some extra rest.   Take an over-the-counter pain medicine, such as acetaminophen (Tylenol), ibuprofen (Advil, Motrin), or naproxen (Aleve) to reduce fever and relieve body aches. Read and follow all instructions on the label.  Do not take two or more pain medicines at the same time unless the doctor told you to. Many pain medicines have acetaminophen, which is Tylenol. Too much acetaminophen (Tylenol) can be harmful.  Take an over-the-counter cough medicine to help quiet a dry, hacking cough so that you can sleep. Avoid cough medicines that have more than one active ingredient. Read and follow all instructions on the label.  Do not smoke. Smoking can make bronchitis worse.  If you need help quitting, talk to your doctor about stop-smoking programs and medicines. These can increase your chances of quitting for good. When should you call for help? Call 911 anytime you think you may need emergency care. For example, call if:     You have severe trouble breathing. Call your doctor now or seek immediate medical care if:     You have new or worse trouble breathing.      You cough up dark brown or bloody mucus (sputum).      You have a new or higher fever.      You have a new rash. Watch closely for changes in your health, and be sure to contact your doctor if:     You cough more deeply or more often, especially if you notice more mucus or a change in the color of your mucus.      You are not getting better as expected. Where can you learn more? Go to [email or web address]://[email or web address].Cabe na Mala. org and sign in to your [employer] account. Enter H333 in the [employer] (T/A) box to learn more about \"Bronchitis: Care Instructions. \"     If you do not have an account, please click on the \"Sign Up Now\" link. Current as of: July 6, 2021               Content Version: 13.2  © 5581-8394 [employer] Power. Care instructions adapted under license by [facility] ([facility]). If you have questions about a medical condition or this instruction, always ask your healthcare professional. [place] [address] any warranty or liability for your use of this information. Patient Education        Earwax Blockage: Care Instructions  Your Care Instructions     Earwax is a natural substance that protects the ear canal. Normally, earwax drains from the ears and does not cause problems. Sometimes earwax builds up and hardens. Earwax blockage (also called cerumen impaction) can cause some loss of hearing and pain. When wax is tightly packed, you will need to haveyour doctor remove it. Follow-up care is a key part of your treatment and safety.  Be sure to make and go to all appointments, and call your doctor if you are having problems. It's also a good idea to know your test results and keep alist of the medicines you take. How can you care for yourself at home?  Do not try to remove earwax with cotton swabs, fingers, or other objects. This can make the blockage worse and damage the eardrum.  If your doctor recommends that you try to remove earwax at home:  ? Soften and loosen the earwax with warm mineral oil. You also can try hydrogen peroxide mixed with an equal amount of room temperature water. Place 2 drops of the fluid, warmed to body temperature, in the ear two times a day for up to 5 days. ? Once the wax is loose and soft, all that is usually needed to remove it from the ear canal is a gentle, warm shower. Direct the water into the ear, then tip your head to let the earwax drain out. Dry your ear thoroughly with a hair dryer set on low. Hold the dryer several inches from your ear. ? If the warm mineral oil and shower do not work, use an over-the-counter wax softener. Read and follow all instructions on the label. After using the wax softener, use an ear syringe to gently flush the ear. Make sure the flushing solution is body temperature. Cool or hot fluids in the ear can cause dizziness. When should you call for help? Call your doctor now or seek immediate medical care if:     Pus or blood drains from your ear.      Your ears are ringing or feel full.      You have a loss of hearing. Watch closely for changes in your health, and be sure to contact your doctor if:     You have pain or reduced hearing after 1 week of home treatment.      You have any new symptoms, such as nausea or balance problems. Where can you learn more? Go to https://AdInnovationpeandiGigParkeb.Dynamaxx Mfg. org and sign in to your Avvenu account. Enter Y166 in the [employer]yet box to learn more about \"Earwax Blockage: Care Instructions. \"     If you do not have an account, please click on the \"Sign Up Now\" link.   Current as of: July 1, 2021               Content Version: 13.2  © 4999-4847 Healthwise, Incorporated. Care instructions adapted under license by [facility] ([facility]). If you have questions about a medical condition or this instruction, always ask your healthcare professional. [place] [address] any warranty or liability for your use of this information. Patient/Caregiver instructed on use, benefit, and side effects of prescribed medications. All patient/parent/caregiver questions answered. Patient/parent/caregiver voiced understanding. Reviewed health maintenance. Instructed to continue current medications, diet and exercise. Patient agreed with treatment plan. Follow up as directed.            Electronically signed by MARIO Lake  [id number]

## 2022-06-03 DIAGNOSIS — J40 BRONCHITIS: Primary | ICD-10-CM

## 2022-06-03 RX ORDER — BENZONATATE 200 MG/1
200 CAPSULE ORAL 3 TIMES DAILY PRN
Qty: 30 CAPSULE | Refills: 0 | Status: SHIPPED | OUTPATIENT
Start: 2022-06-03 | End: 2022-06-13

## 2024-02-05 ENCOUNTER — TELEPHONE (OUTPATIENT)
Dept: FAMILY MEDICINE CLINIC | Age: 59
End: 2024-02-05

## 2024-02-13 SDOH — HEALTH STABILITY: PHYSICAL HEALTH: ON AVERAGE, HOW MANY DAYS PER WEEK DO YOU ENGAGE IN MODERATE TO STRENUOUS EXERCISE (LIKE A BRISK WALK)?: 3 DAYS

## 2024-02-13 SDOH — HEALTH STABILITY: PHYSICAL HEALTH: ON AVERAGE, HOW MANY MINUTES DO YOU ENGAGE IN EXERCISE AT THIS LEVEL?: 40 MIN

## 2024-02-14 ENCOUNTER — OFFICE VISIT (OUTPATIENT)
Dept: FAMILY MEDICINE CLINIC | Age: 59
End: 2024-02-14
Payer: COMMERCIAL

## 2024-02-14 VITALS
HEART RATE: 88 BPM | SYSTOLIC BLOOD PRESSURE: 126 MMHG | WEIGHT: 206 LBS | HEIGHT: 74 IN | BODY MASS INDEX: 26.44 KG/M2 | DIASTOLIC BLOOD PRESSURE: 78 MMHG | OXYGEN SATURATION: 100 %

## 2024-02-14 DIAGNOSIS — G47.9 SLEEP DISTURBANCE: ICD-10-CM

## 2024-02-14 DIAGNOSIS — M70.52 PATELLAR BURSITIS, LEFT: ICD-10-CM

## 2024-02-14 DIAGNOSIS — R53.82 CHRONIC FATIGUE: ICD-10-CM

## 2024-02-14 DIAGNOSIS — R13.19 ESOPHAGEAL DYSPHAGIA: Primary | ICD-10-CM

## 2024-02-14 DIAGNOSIS — Z12.5 SCREENING FOR PROSTATE CANCER: ICD-10-CM

## 2024-02-14 DIAGNOSIS — K22.2 ESOPHAGEAL STENOSIS: ICD-10-CM

## 2024-02-14 DIAGNOSIS — Z13.220 SCREENING, LIPID: ICD-10-CM

## 2024-02-14 DIAGNOSIS — Z51.81 MEDICATION MONITORING ENCOUNTER: ICD-10-CM

## 2024-02-14 DIAGNOSIS — Z12.11 SCREEN FOR COLON CANCER: ICD-10-CM

## 2024-02-14 DIAGNOSIS — Z23 NEED FOR DIPHTHERIA-TETANUS-PERTUSSIS (TDAP) VACCINE: ICD-10-CM

## 2024-02-14 DIAGNOSIS — Z13.1 SCREENING FOR DIABETES MELLITUS: ICD-10-CM

## 2024-02-14 PROCEDURE — 99204 OFFICE O/P NEW MOD 45 MIN: CPT | Performed by: FAMILY MEDICINE

## 2024-02-14 PROCEDURE — 90471 IMMUNIZATION ADMIN: CPT | Performed by: FAMILY MEDICINE

## 2024-02-14 PROCEDURE — 3017F COLORECTAL CA SCREEN DOC REV: CPT | Performed by: FAMILY MEDICINE

## 2024-02-14 PROCEDURE — 1036F TOBACCO NON-USER: CPT | Performed by: FAMILY MEDICINE

## 2024-02-14 PROCEDURE — G8427 DOCREV CUR MEDS BY ELIG CLIN: HCPCS | Performed by: FAMILY MEDICINE

## 2024-02-14 PROCEDURE — 90715 TDAP VACCINE 7 YRS/> IM: CPT | Performed by: FAMILY MEDICINE

## 2024-02-14 PROCEDURE — G8484 FLU IMMUNIZE NO ADMIN: HCPCS | Performed by: FAMILY MEDICINE

## 2024-02-14 PROCEDURE — G8419 CALC BMI OUT NRM PARAM NOF/U: HCPCS | Performed by: FAMILY MEDICINE

## 2024-02-14 RX ORDER — TRAZODONE HYDROCHLORIDE 50 MG/1
50 TABLET ORAL NIGHTLY
Qty: 30 TABLET | Refills: 1 | Status: SHIPPED | OUTPATIENT
Start: 2024-02-14

## 2024-02-14 SDOH — ECONOMIC STABILITY: HOUSING INSECURITY
IN THE LAST 12 MONTHS, WAS THERE A TIME WHEN YOU DID NOT HAVE A STEADY PLACE TO SLEEP OR SLEPT IN A SHELTER (INCLUDING NOW)?: NO

## 2024-02-14 SDOH — ECONOMIC STABILITY: FOOD INSECURITY: WITHIN THE PAST 12 MONTHS, YOU WORRIED THAT YOUR FOOD WOULD RUN OUT BEFORE YOU GOT MONEY TO BUY MORE.: NEVER TRUE

## 2024-02-14 SDOH — ECONOMIC STABILITY: FOOD INSECURITY: WITHIN THE PAST 12 MONTHS, THE FOOD YOU BOUGHT JUST DIDN'T LAST AND YOU DIDN'T HAVE MONEY TO GET MORE.: NEVER TRUE

## 2024-02-14 SDOH — ECONOMIC STABILITY: INCOME INSECURITY: HOW HARD IS IT FOR YOU TO PAY FOR THE VERY BASICS LIKE FOOD, HOUSING, MEDICAL CARE, AND HEATING?: NOT HARD AT ALL

## 2024-02-14 ASSESSMENT — PATIENT HEALTH QUESTIONNAIRE - PHQ9
SUM OF ALL RESPONSES TO PHQ QUESTIONS 1-9: 0
SUM OF ALL RESPONSES TO PHQ9 QUESTIONS 1 & 2: 0
SUM OF ALL RESPONSES TO PHQ QUESTIONS 1-9: 0
2. FEELING DOWN, DEPRESSED OR HOPELESS: 0
SUM OF ALL RESPONSES TO PHQ QUESTIONS 1-9: 0
SUM OF ALL RESPONSES TO PHQ QUESTIONS 1-9: 0
1. LITTLE INTEREST OR PLEASURE IN DOING THINGS: 0

## 2024-02-14 NOTE — PROGRESS NOTES
[facility] [place]  [address], [address]  [place] [address]  Dept: [phone]  Dept Fax:[phone]    Brandon Salazar is a 58 y.o. male who presents today for his medical conditions/complaints as notedbelow.  Brandon Salazar is c/o of New Patient and Congestion (Pt states he has been fighting congestion for a few weeks )      Assessment/Plan:     1. Esophageal dysphagia  With previous history of stenosis and his current recurrent symptoms will refer for EGD and possible redilation.  In the interim avoid medications that can increase acid reflux small bites with fluids frequent sips.  - [facility], [place]    2. Esophageal stenosis    - [facility], [place]    3. Patellar bursitis, left  Minimal symptoms at this time.  Avoid activities that aggravate such as direct pressure on the knee.  Reviewed signs and symptoms of infection that would prompt reevaluation and urgent treatment.    4. Sleep disturbance  Sleep hygiene reviewed.  Reviewed over-the-counter treatment options.  Will trial trazodone initially 1 tab may increase to 2 if needed.  Could consider SSRI treatment if not effective.  - traZODone (DESYREL) 50 MG tablet; Take 1 tablet by mouth nightly  Dispense: 30 tablet; Refill: 1    5. Screen for colon cancer    - [facility], [place]    6. Screening for diabetes mellitus      - Glucose, Fasting; Future    7. Screening for prostate cancer    - PSA Screening; Future    8. Screening, lipid    - Lipid Panel; Future    9. Need for diphtheria-tetanus-pertussis (Tdap) vaccine    - Tdap, BOOSTRIX, (age 10 yrs+), IM    10. Chronic fatigue    - CBC with Auto Differential; Future  - TSH With Reflex Ft4; Future    11. Medication monitoring encounter    - Vitamin B12; Future        Lab Results   Component Value Date    WBC 8.9 10/28/2013    HGB 15.7 10/28/2013    HCT 45.7 10/28/2013     10/28/2013

## 2024-02-14 NOTE — PROGRESS NOTES
After obtaining consent, and per orders of Dr. Harvey, injection of TDAP given in Left deltoid by Lexy Bloom MA. Patient tolerated well.  Patient instructed to remain in clinic for 20 minutes afterwards, and to report any adverse reaction immediately.

## 2024-03-02 LAB
BASOPHILS %: 1.38 (ref 0–3)
BASOPHILS ABSOLUTE: 0.09 (ref 0–0.3)
CHOLESTEROL/HDL RATIO: 5.95 RATIO (ref 0–4.5)
CHOLESTEROL: 226 MG/DL (ref 50–200)
DIAGNOSTIC PSA: 3.57 NG/ML (ref 0–4)
EOSINOPHILS %: 1.18 (ref 0–10)
EOSINOPHILS ABSOLUTE: 0.08 (ref 0–1.1)
GLUCOSE: 104 MG/DL (ref 75–110)
HCT VFR BLD CALC: 48.1 % (ref 42–52)
HDLC SERPL-MCNC: 38 MG/DL (ref 36–68)
HEMOGLOBIN: 16.6 (ref 13.8–17.8)
LDL CHOLESTEROL CALCULATED: 160.6 MG/DL (ref 0–160)
LYMPHOCYTE %: 29.05 (ref 20–51.1)
LYMPHOCYTES ABSOLUTE: 1.99 (ref 1–5.5)
MCH RBC QN AUTO: 30.9 PG (ref 28.5–32.5)
MCHC RBC AUTO-ENTMCNC: 34.5 G/DL (ref 32–37)
MCV RBC AUTO: 89.4 FL (ref 80–94)
MONOCYTES %: 9.35 (ref 1.7–9.3)
MONOCYTES ABSOLUTE: 0.64 (ref 0.1–1)
NEUTROPHILS %: 59.05 (ref 42.2–75.2)
NEUTROPHILS ABSOLUTE: 4.04 (ref 2–8.1)
PDW BLD-RTO: 11.4 % (ref 10–15.5)
PLATELET # BLD: 336.7 THOU/MM3 (ref 130–400)
RBC: 5.38 M/UL (ref 4.7–6.1)
TRIGL SERPL-MCNC: 137 MG/DL (ref 10–250)
TSH REFLEX FT4: 0.87 MIU/ML (ref 0.49–4.67)
VITAMIN B-12: 621 PG/ML (ref 239–931)
VLDLC SERPL CALC-MCNC: 27 MG/DL (ref 0–50)
WBC: 6.8 THOU/ML3 (ref 4.8–10.8)

## 2024-03-12 ENCOUNTER — INITIAL CONSULT (OUTPATIENT)
Dept: SURGERY | Age: 59
End: 2024-03-12
Payer: COMMERCIAL

## 2024-03-12 VITALS
SYSTOLIC BLOOD PRESSURE: 128 MMHG | HEART RATE: 71 BPM | BODY MASS INDEX: 26.13 KG/M2 | OXYGEN SATURATION: 97 % | DIASTOLIC BLOOD PRESSURE: 84 MMHG | HEIGHT: 74 IN | WEIGHT: 203.6 LBS

## 2024-03-12 DIAGNOSIS — R13.19 ESOPHAGEAL DYSPHAGIA: Primary | ICD-10-CM

## 2024-03-12 DIAGNOSIS — Z87.19 HISTORY OF ESOPHAGEAL STRICTURE: ICD-10-CM

## 2024-03-12 DIAGNOSIS — Z12.11 COLON CANCER SCREENING: ICD-10-CM

## 2024-03-12 PROCEDURE — 99214 OFFICE O/P EST MOD 30 MIN: CPT | Performed by: SURGERY

## 2024-03-12 PROCEDURE — 3017F COLORECTAL CA SCREEN DOC REV: CPT | Performed by: SURGERY

## 2024-03-12 PROCEDURE — 1036F TOBACCO NON-USER: CPT | Performed by: SURGERY

## 2024-03-12 PROCEDURE — G8484 FLU IMMUNIZE NO ADMIN: HCPCS | Performed by: SURGERY

## 2024-03-12 PROCEDURE — G8419 CALC BMI OUT NRM PARAM NOF/U: HCPCS | Performed by: SURGERY

## 2024-03-12 PROCEDURE — G8427 DOCREV CUR MEDS BY ELIG CLIN: HCPCS | Performed by: SURGERY

## 2024-03-12 RX ORDER — OMEGA-3/DHA/EPA/FISH OIL 60 MG-90MG
1 CAPSULE ORAL DAILY
COMMUNITY

## 2024-03-12 NOTE — PROGRESS NOTES
Subjective   Brandon Salazar is a 58 y.o. male who presents today for further evaluation for possible EGD and dilation of esophagus.  Patient has a history of esophageal stricture and in 2015 had to undergo EGD with dilation.  He did well but then in 2021 I actually saw him at [facility] for impacted food in the esophagus.  This was removed and he was started on therapy and has not had any significant issues.  However he states that more recently has begun to notice that sometimes he will feel food getting stuck in the lower esophagus.  This usually passes quickly or is able to get it to pass with liquid intake.  Does still get some reflux symptoms.  Is on daily PPI therapy and it keeps him under control but he states that certain times he will still have breakthrough symptoms.  Was eating a fairly higher fat diet but has started to change his diet recently due to high cholesterol and states that that has improved symptoms some.  Recently saw his PCP back and because of the ongoing symptoms and the concern for possible stricturing and he was referred to general surgery.  Denies any emesis.  No hematemesis.    Patient is also due for colon cancer screening.  Had a Cologuard test 3 years ago which was negative.  When he recently saw his PCP was recommended to consider colonoscopy for his repeat screening.  Denies any family history of colon cancer.  No recent changes in bowel movements.  No other complaints.    Past Medical History:   Diagnosis Date    Anxiety 2015    Duodenal ulcer     GERD (gastroesophageal reflux disease)     Few years ago    Hearing loss     Few years ago    Peripheral vascular disease (HCC)     Restless legs syndrome     At least 10 years    Schatzki's ring     Umbilical hernia        Past Surgical History:   Procedure Laterality Date    INGUINAL HERNIA REPAIR Left 2015    UPPER GASTROINTESTINAL ENDOSCOPY N/A 01/2015       Current Outpatient Medications   Medication Sig Dispense Refill

## 2024-03-12 NOTE — ASSESSMENT & PLAN NOTE
Patient's last colon cancer screening was 3 years ago with [facility] which means he would be due for repeat screening.  Have recommended colonoscopy as it is the gold standard for colon cancer screening.  Risks of the procedure including bleeding, infection, perforation, need for the surgery and anesthesia risk are discussed and consent is obtained.  Will use MiraLAX prep at patient request.  Instructions reviewed.

## 2024-03-12 NOTE — ASSESSMENT & PLAN NOTE
Patient does have current dysphagia and history of Schatzki's ring which required dilation in the past.  Continues to have some breakthrough reflux despite medical therapy.  Going to plan for EGD with possible dilation to further investigate and treat.  Risks of the procedure including bleeding, infection, perforation, need for the surgery and anesthesia risk are discussed and consent is obtained.

## 2024-03-27 ENCOUNTER — ANESTHESIA EVENT (OUTPATIENT)
Dept: OPERATING ROOM | Age: 59
End: 2024-03-27
Payer: COMMERCIAL

## 2024-03-27 ENCOUNTER — HOSPITAL ENCOUNTER (OUTPATIENT)
Age: 59
Setting detail: OUTPATIENT SURGERY
Discharge: HOME | End: 2024-03-27
Attending: SURGERY | Admitting: SURGERY
Payer: COMMERCIAL

## 2024-03-27 ENCOUNTER — ANESTHESIA (OUTPATIENT)
Dept: OPERATING ROOM | Age: 59
End: 2024-03-27
Payer: COMMERCIAL

## 2024-03-27 VITALS
OXYGEN SATURATION: 97 % | DIASTOLIC BLOOD PRESSURE: 56 MMHG | TEMPERATURE: 97.4 F | WEIGHT: 189.2 LBS | HEIGHT: 74 IN | RESPIRATION RATE: 16 BRPM | HEART RATE: 48 BPM | BODY MASS INDEX: 24.28 KG/M2 | SYSTOLIC BLOOD PRESSURE: 94 MMHG

## 2024-03-27 DIAGNOSIS — Z87.19 HISTORY OF ESOPHAGEAL STRICTURE: ICD-10-CM

## 2024-03-27 DIAGNOSIS — R13.19 ESOPHAGEAL DYSPHAGIA: ICD-10-CM

## 2024-03-27 DIAGNOSIS — Z12.11 COLON CANCER SCREENING: ICD-10-CM

## 2024-03-27 LAB — GLUCOSE BLD-MCNC: 93 MG/DL (ref 75–110)

## 2024-03-27 PROCEDURE — 82947 ASSAY GLUCOSE BLOOD QUANT: CPT

## 2024-03-27 PROCEDURE — 3700000001 HC ADD 15 MINUTES (ANESTHESIA): Performed by: SURGERY

## 2024-03-27 PROCEDURE — 7100000011 HC PHASE II RECOVERY - ADDTL 15 MIN: Performed by: SURGERY

## 2024-03-27 PROCEDURE — 3609012400 HC EGD TRANSORAL BIOPSY SINGLE/MULTIPLE: Performed by: SURGERY

## 2024-03-27 PROCEDURE — 3700000000 HC ANESTHESIA ATTENDED CARE: Performed by: SURGERY

## 2024-03-27 PROCEDURE — 3609027000 HC COLONOSCOPY: Performed by: SURGERY

## 2024-03-27 PROCEDURE — 7100000010 HC PHASE II RECOVERY - FIRST 15 MIN: Performed by: SURGERY

## 2024-03-27 PROCEDURE — 6360000002 HC RX W HCPCS

## 2024-03-27 PROCEDURE — 2580000003 HC RX 258: Performed by: SURGERY

## 2024-03-27 PROCEDURE — 2709999900 HC NON-CHARGEABLE SUPPLY: Performed by: SURGERY

## 2024-03-27 PROCEDURE — 88305 TISSUE EXAM BY PATHOLOGIST: CPT

## 2024-03-27 PROCEDURE — 2500000003 HC RX 250 WO HCPCS

## 2024-03-27 RX ORDER — SODIUM CHLORIDE 9 MG/ML
INJECTION, SOLUTION INTRAVENOUS PRN
Status: DISCONTINUED | OUTPATIENT
Start: 2024-03-27 | End: 2024-03-27 | Stop reason: HOSPADM

## 2024-03-27 RX ORDER — PROPOFOL 10 MG/ML
INJECTION, EMULSION INTRAVENOUS PRN
Status: DISCONTINUED | OUTPATIENT
Start: 2024-03-27 | End: 2024-03-27 | Stop reason: SDUPTHER

## 2024-03-27 RX ORDER — SODIUM CHLORIDE, SODIUM LACTATE, POTASSIUM CHLORIDE, CALCIUM CHLORIDE 600; 310; 30; 20 MG/100ML; MG/100ML; MG/100ML; MG/100ML
INJECTION, SOLUTION INTRAVENOUS CONTINUOUS
Status: DISCONTINUED | OUTPATIENT
Start: 2024-03-27 | End: 2024-03-27 | Stop reason: HOSPADM

## 2024-03-27 RX ORDER — LIDOCAINE HYDROCHLORIDE 40 MG/ML
INJECTION, SOLUTION RETROBULBAR PRN
Status: DISCONTINUED | OUTPATIENT
Start: 2024-03-27 | End: 2024-03-27 | Stop reason: SDUPTHER

## 2024-03-27 RX ORDER — DEXMEDETOMIDINE HYDROCHLORIDE 100 UG/ML
INJECTION, SOLUTION INTRAVENOUS PRN
Status: DISCONTINUED | OUTPATIENT
Start: 2024-03-27 | End: 2024-03-27 | Stop reason: SDUPTHER

## 2024-03-27 RX ORDER — SODIUM CHLORIDE 0.9 % (FLUSH) 0.9 %
5-40 SYRINGE (ML) INJECTION EVERY 12 HOURS SCHEDULED
Status: DISCONTINUED | OUTPATIENT
Start: 2024-03-27 | End: 2024-03-27 | Stop reason: HOSPADM

## 2024-03-27 RX ORDER — SODIUM CHLORIDE 0.9 % (FLUSH) 0.9 %
5-40 SYRINGE (ML) INJECTION PRN
Status: DISCONTINUED | OUTPATIENT
Start: 2024-03-27 | End: 2024-03-27 | Stop reason: HOSPADM

## 2024-03-27 RX ADMIN — PHENYLEPHRINE HYDROCHLORIDE 100 MCG: 10 INJECTION INTRAVENOUS at 09:43

## 2024-03-27 RX ADMIN — SODIUM CHLORIDE, POTASSIUM CHLORIDE, SODIUM LACTATE AND CALCIUM CHLORIDE: 600; 310; 30; 20 INJECTION, SOLUTION INTRAVENOUS at 09:07

## 2024-03-27 RX ADMIN — PHENYLEPHRINE HYDROCHLORIDE 100 MCG: 10 INJECTION INTRAVENOUS at 09:35

## 2024-03-27 RX ADMIN — LIDOCAINE HYDROCHLORIDE 100 MG: 40 INJECTION, SOLUTION RETROBULBAR; TOPICAL at 09:18

## 2024-03-27 RX ADMIN — PROPOFOL 180 MCG/KG/MIN: 10 INJECTION, EMULSION INTRAVENOUS at 09:19

## 2024-03-27 RX ADMIN — DEXMEDETOMIDINE HYDROCHLORIDE 15 MCG: 100 INJECTION, SOLUTION INTRAVENOUS at 09:17

## 2024-03-27 RX ADMIN — PROPOFOL 150 MG: 10 INJECTION, EMULSION INTRAVENOUS at 09:18

## 2024-03-27 ASSESSMENT — LIFESTYLE VARIABLES: SMOKING_STATUS: 0

## 2024-03-27 ASSESSMENT — PAIN SCALES - GENERAL
PAINLEVEL_OUTOF10: 0

## 2024-03-27 ASSESSMENT — PAIN - FUNCTIONAL ASSESSMENT
PAIN_FUNCTIONAL_ASSESSMENT: ADULT NONVERBAL PAIN SCALE (NPVS)
PAIN_FUNCTIONAL_ASSESSMENT: 0-10

## 2024-03-27 NOTE — PROCEDURES
[facility]                [address] [place] [address]                             PROCEDURE NOTE      PATIENT NAME: FLORENCE GONZALEZ                 : 1965  MED REC NO: [id number]                         ROOM: [facility]  ACCOUNT NO: [id number]                       ADMIT DATE: 2024  PROVIDER: Arian Miles DO      DATE OF PROCEDURE:  2024    SURGEON:  Arian Miles DO    ASSISTANT:  None.    PREOPERATIVE DIAGNOSES:    1. Dysphagia.  2. Screening for colon cancer.    POSTOPERATIVE DIAGNOSES:    1. Dysphagia.  2. Screening for colon cancer.  3. Diverticulosis.    PROCEDURES:    1. Esophagogastroduodenoscopy with biopsy.  2. Colonoscopy.    ANESTHESIA:  MAC.    ESTIMATED BLOOD LOSS:  Minimal.    FLUIDS:  Per Anesthesia record.    COMPLICATIONS:  None.    SPECIMENS:  Biopsy in the antrum.    INDICATION FOR PROCEDURE:  The patient is a 58-year-old gentleman who was referred to my office for the above preoperative diagnoses.  He has had a stricture in the esophagus in the past, which required dilation and I was concerned this may have recurred.  After I saw him in the office, decision was made to proceed with EGD and colonoscopy.  Prior to the time of the procedure, risks, benefits, and alternatives were explained to the patient.  Consent was obtained.    DESCRIPTION OF PROCEDURE:  The patient was brought to the endoscopy suite, kept on preop gurney, placed in left lateral decubitus position.  Monitoring devices were placed.  MAC anesthesia was induced.  After induction of anesthesia, a time-out was performed, and correct patient and procedure were verified.  Bite block was placed.  The Olympus video endoscope was lubricated and inserted into the patient's oropharynx and directed in the esophagus.  Scope was easily advanced down the esophagus into the lumen of stomach.  During advancement, there was no notice of any stricturing or scarring in the

## 2024-03-27 NOTE — H&P
current dysphagia and history of Schatzki's ring which required dilation in the past.  Continues to have some breakthrough reflux despite medical therapy.  Going to plan for EGD with possible dilation to further investigate and treat.  Risks of the procedure including bleeding, infection, perforation, need for the surgery and anesthesia risk are discussed and consent is obtained.     2. History of esophageal stricture  3. Colon cancer screening  Assessment & Plan:  Patient's last colon cancer screening was 3 years ago with Cologuard which means he would be due for repeat screening.  Have recommended colonoscopy as it is the gold standard for colon cancer screening.  Risks of the procedure including bleeding, infection, perforation, need for the surgery and anesthesia risk are discussed and consent is obtained.  Will use MiraLAX prep at patient request.  Instructions reviewed.           (Please note that portions of this note were completed with a voice recognition program.  Efforts were made to edit the dictations but occasionally words are mis-transcribed.)    The patient was interviewed and examined and there have been no changes since the above History and Physical.    Electronically signed by Arian Miles DO on 3/26/2024 at 8:07 PM

## 2024-03-27 NOTE — ANESTHESIA POSTPROCEDURE EVALUATION
Department of Anesthesiology  Postprocedure Note    Patient: Brandon Salazar  MRN: [id number]  YOB: 1965  Date of evaluation: 3/27/2024    Procedure Summary       Date: 03/27/24 Room / Location: [facility]    Anesthesia Start: 0917 Anesthesia Stop: 0952    Procedures:       ESOPHAGOGASTRODUODENOSCOPY BIOPSY (Esophagus)      COLONOSCOPY (Anus) Diagnosis:       Esophageal dysphagia      History of esophageal stricture      Colon cancer screening      (Esophageal dysphagia [R13.19])      (History of esophageal stricture [Z87.19])      (Colon cancer screening [Z12.11])    Surgeons: Arian Miles DO Responsible Provider: Miguel Painting APRN - CRNA    Anesthesia Type: General, TIVA ASA Status: 2            Anesthesia Type: General, TIVA    Eric Phase I: Eric Score: 10    Eric Phase II:      Anesthesia Post Evaluation    Patient location during evaluation: bedside  Patient participation: complete - patient participated  Level of consciousness: sleepy but conscious  Airway patency: patent  Nausea & Vomiting: no nausea  Cardiovascular status: hemodynamically stable  Respiratory status: nasal cannula  Hydration status: euvolemic  Pain management: satisfactory to patient    No notable events documented.

## 2024-03-27 NOTE — ANESTHESIA PRE PROCEDURE
Department of Anesthesiology  Preprocedure Note       Name:  Brandon Salazar   Age:  58 y.o.  :  1965                                          MRN:  [id number]         Date:  3/27/2024      Surgeon: Surgeon(s):  Arian Miles DO    Procedure: Procedure(s):  EGD with possible dilatation  COLONOSCOPY    Medications prior to admission:   Prior to Admission medications    Medication Sig Start Date End Date Taking? Authorizing Provider   Omega-3 Fatty Acids (FISH OIL) 500 MG CAPS Take 500 mg by mouth daily    ProviderSaul MD   traZODone (DESYREL) 50 MG tablet Take 1 tablet by mouth nightly  Patient not taking: Reported on 3/12/2024 2/14/24   Cece Alcocer MD   omeprazole 20 MG EC tablet Take 1 tablet by mouth daily    ProviderSaul MD       Current medications:    No current facility-administered medications for this encounter.     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   • Mixed hyperlipidemia E78.2   • Esophageal dysphagia R13.19   • History of esophageal stricture Z87.19   • Colon cancer screening Z12.11       Past Medical History:        Diagnosis Date   • Anxiety    • Duodenal ulcer    • GERD (gastroesophageal reflux disease)     Few years ago   • Hearing loss     Few years ago   • Peripheral vascular disease (HCC)    • Restless legs syndrome     At least 10 years   • Schatzki's ring    • Umbilical hernia        Past Surgical History:        Procedure Laterality Date   • INGUINAL HERNIA REPAIR Left    • UPPER GASTROINTESTINAL ENDOSCOPY N/A 2015       Social History:

## 2024-03-29 LAB — SURGICAL PATHOLOGY REPORT: NORMAL

## 2024-04-11 PROBLEM — Z12.11 COLON CANCER SCREENING: Status: RESOLVED | Noted: 2024-03-12 | Resolved: 2024-04-11

## 2025-02-19 SDOH — ECONOMIC STABILITY: INCOME INSECURITY: IN THE LAST 12 MONTHS, WAS THERE A TIME WHEN YOU WERE NOT ABLE TO PAY THE MORTGAGE OR RENT ON TIME?: NO

## 2025-02-19 SDOH — ECONOMIC STABILITY: FOOD INSECURITY: WITHIN THE PAST 12 MONTHS, YOU WORRIED THAT YOUR FOOD WOULD RUN OUT BEFORE YOU GOT MONEY TO BUY MORE.: NEVER TRUE

## 2025-02-19 SDOH — ECONOMIC STABILITY: TRANSPORTATION INSECURITY
IN THE PAST 12 MONTHS, HAS THE LACK OF TRANSPORTATION KEPT YOU FROM MEDICAL APPOINTMENTS OR FROM GETTING MEDICATIONS?: NO

## 2025-02-19 SDOH — ECONOMIC STABILITY: FOOD INSECURITY: WITHIN THE PAST 12 MONTHS, THE FOOD YOU BOUGHT JUST DIDN'T LAST AND YOU DIDN'T HAVE MONEY TO GET MORE.: NEVER TRUE

## 2025-02-19 SDOH — ECONOMIC STABILITY: TRANSPORTATION INSECURITY
IN THE PAST 12 MONTHS, HAS LACK OF TRANSPORTATION KEPT YOU FROM MEETINGS, WORK, OR FROM GETTING THINGS NEEDED FOR DAILY LIVING?: NO

## 2025-02-19 ASSESSMENT — PATIENT HEALTH QUESTIONNAIRE - PHQ9
1. LITTLE INTEREST OR PLEASURE IN DOING THINGS: NOT AT ALL
SUM OF ALL RESPONSES TO PHQ QUESTIONS 1-9: 0
SUM OF ALL RESPONSES TO PHQ QUESTIONS 1-9: 0
SUM OF ALL RESPONSES TO PHQ9 QUESTIONS 1 & 2: 0
2. FEELING DOWN, DEPRESSED OR HOPELESS: NOT AT ALL
SUM OF ALL RESPONSES TO PHQ QUESTIONS 1-9: 0
1. LITTLE INTEREST OR PLEASURE IN DOING THINGS: NOT AT ALL
SUM OF ALL RESPONSES TO PHQ9 QUESTIONS 1 & 2: 0
SUM OF ALL RESPONSES TO PHQ QUESTIONS 1-9: 0
2. FEELING DOWN, DEPRESSED OR HOPELESS: NOT AT ALL

## 2025-02-21 ENCOUNTER — OFFICE VISIT (OUTPATIENT)
Dept: FAMILY MEDICINE CLINIC | Age: 60
End: 2025-02-21
Payer: COMMERCIAL

## 2025-02-21 VITALS
SYSTOLIC BLOOD PRESSURE: 132 MMHG | BODY MASS INDEX: 26.19 KG/M2 | OXYGEN SATURATION: 99 % | WEIGHT: 204 LBS | DIASTOLIC BLOOD PRESSURE: 80 MMHG | HEART RATE: 77 BPM

## 2025-02-21 DIAGNOSIS — L91.8 INFLAMED SKIN TAG: ICD-10-CM

## 2025-02-21 DIAGNOSIS — R20.2 PARESTHESIA OF LEFT LEG: Primary | ICD-10-CM

## 2025-02-21 PROCEDURE — G8427 DOCREV CUR MEDS BY ELIG CLIN: HCPCS | Performed by: FAMILY MEDICINE

## 2025-02-21 PROCEDURE — G8419 CALC BMI OUT NRM PARAM NOF/U: HCPCS | Performed by: FAMILY MEDICINE

## 2025-02-21 PROCEDURE — 3017F COLORECTAL CA SCREEN DOC REV: CPT | Performed by: FAMILY MEDICINE

## 2025-02-21 PROCEDURE — 99213 OFFICE O/P EST LOW 20 MIN: CPT | Performed by: FAMILY MEDICINE

## 2025-02-21 PROCEDURE — G2211 COMPLEX E/M VISIT ADD ON: HCPCS | Performed by: FAMILY MEDICINE

## 2025-02-21 PROCEDURE — 1036F TOBACCO NON-USER: CPT | Performed by: FAMILY MEDICINE

## 2025-02-21 RX ORDER — MULTIVIT-MIN/IRON/FOLIC ACID/K 18-600-40
CAPSULE ORAL
COMMUNITY
Start: 2024-12-01

## 2025-02-21 NOTE — PROGRESS NOTES
[facility] [place]  [address], [address]  [place] [id number]  Dept: [phone]  Dept Fax:[phone]    Brandon Salazar is a 59 y.o. male who presents today for his medical conditions/complaints as notedbelow.        Assessment/Plan:     Assessment & Plan  1. Tingling sensation in the left leg.  The symptoms suggest a possible impingement of the saphenous nerve, likely due to intermittent pressure from his work activities. This could potentially lead to inflammation and irritation of the nerve, which may progress to motor issues if not addressed. He was advised to make necessary modifications at his workplace to alleviate the pressure on the nerve, allowing it to heal over the next few months. If the symptoms persist or if he experiences any weakness in the leg, an EMG will be considered to further evaluate the condition.    2. Skin tags on the eyelids.  The skin tags have thin stalks, making them relatively easy to remove. The procedure can be performed in the office setting, with the possibility of requiring a small stitch if bleeding occurs. The procedure will be covered by insurance as it is impairing his line of vision. He was informed that he can return to work post-procedure, but should maintain cleanliness around the area. An antibacterial ophthalmologic eye ointment will be prescribed for application on the eyelid to prevent infection during the healing process. The procedure is scheduled for a Friday morning, allowing him to keep the area clean over the weekend.      Assessment & Plan  Paresthesia of left leg     Inflamed skin tag          Results      Lab Results   Component Value Date    WBC 6.8 03/02/2024    HGB 16.6 03/02/2024    HCT 48.1 03/02/2024    .7 03/02/2024    CHOL 226 (H) 03/02/2024    TRIG 137 03/02/2024    HDL 38 03/02/2024    PSA 3.57 03/02/2024    GLUF 99 10/28/2020       Return for As scheduled.    Subjective:      HPI:     Brandon Salazar is c/o

## 2025-02-28 ENCOUNTER — PROCEDURE VISIT (OUTPATIENT)
Dept: FAMILY MEDICINE CLINIC | Age: 60
End: 2025-02-28

## 2025-02-28 VITALS
HEART RATE: 82 BPM | WEIGHT: 200 LBS | OXYGEN SATURATION: 97 % | BODY MASS INDEX: 25.68 KG/M2 | SYSTOLIC BLOOD PRESSURE: 120 MMHG | DIASTOLIC BLOOD PRESSURE: 62 MMHG

## 2025-02-28 DIAGNOSIS — H02.9 EYELID LESION: ICD-10-CM

## 2025-02-28 DIAGNOSIS — L91.8 SKIN TAG: Primary | ICD-10-CM

## 2025-02-28 RX ORDER — ERYTHROMYCIN 5 MG/G
OINTMENT OPHTHALMIC
Qty: 1 G | Refills: 0 | Status: SHIPPED | OUTPATIENT
Start: 2025-02-28 | End: 2025-03-10

## 2025-02-28 RX ORDER — LIDOCAINE HYDROCHLORIDE AND EPINEPHRINE 10; 10 MG/ML; UG/ML
3 INJECTION, SOLUTION INFILTRATION; PERINEURAL ONCE
Status: COMPLETED | OUTPATIENT
Start: 2025-02-28 | End: 2025-02-28

## 2025-02-28 RX ADMIN — LIDOCAINE HYDROCHLORIDE AND EPINEPHRINE 3 ML: 10; 10 INJECTION, SOLUTION INFILTRATION; PERINEURAL at 11:35

## 2025-02-28 NOTE — PROGRESS NOTES
made to edit the dictation but occasionally words are mis-transcribed.)    Electronically signed by Cece Alcocer MD on 2/28/2025

## 2025-03-05 DIAGNOSIS — L91.8 SKIN TAG: ICD-10-CM

## 2025-03-05 DIAGNOSIS — H02.9 EYELID LESION: ICD-10-CM

## 2025-07-02 ENCOUNTER — TELEPHONE (OUTPATIENT)
Dept: SURGERY | Age: 60
End: 2025-07-02

## 2025-07-02 NOTE — TELEPHONE ENCOUNTER
Patient wife called stating patient has an abscess by his rectum with bruising going up under his testicles. Wife is asking if there is anything they can do until he can be seen? Please advise. Can contact patients wife Lolis at Ph# [phone]. Writer scheduled patient for 7/08/2025.

## 2025-07-03 NOTE — TELEPHONE ENCOUNTER
Tried Calling phone number, busy signal, unable to speak to anyone.    Called Patient's number, left voicemail to call back.

## 2025-07-03 NOTE — TELEPHONE ENCOUNTER
Spoke with patient, he is pretty sure his abscess is gone, but does complain of some pain with an external hemorrhoid. Told patient just to monitor and take it easy until he sees us next week for his apt. Patient voiced understanding

## 2025-07-08 ENCOUNTER — INITIAL CONSULT (OUTPATIENT)
Dept: SURGERY | Age: 60
End: 2025-07-08
Payer: COMMERCIAL

## 2025-07-08 VITALS
HEIGHT: 73 IN | SYSTOLIC BLOOD PRESSURE: 128 MMHG | HEART RATE: 74 BPM | DIASTOLIC BLOOD PRESSURE: 80 MMHG | BODY MASS INDEX: 26.51 KG/M2 | WEIGHT: 200 LBS

## 2025-07-08 DIAGNOSIS — K64.4 EXTERNAL HEMORRHOID: Primary | ICD-10-CM

## 2025-07-08 PROCEDURE — 99214 OFFICE O/P EST MOD 30 MIN: CPT | Performed by: SURGERY

## 2025-07-08 PROCEDURE — G8419 CALC BMI OUT NRM PARAM NOF/U: HCPCS | Performed by: SURGERY

## 2025-07-08 PROCEDURE — G8427 DOCREV CUR MEDS BY ELIG CLIN: HCPCS | Performed by: SURGERY

## 2025-07-08 PROCEDURE — 1036F TOBACCO NON-USER: CPT | Performed by: SURGERY

## 2025-07-08 PROCEDURE — 3017F COLORECTAL CA SCREEN DOC REV: CPT | Performed by: SURGERY

## 2025-07-08 NOTE — PROGRESS NOTES
Subjective   Brandon Salazar is a 60 y.o. male who presents today for further evaluation of possible hemorrhoid.  Patient reports approximately 1 week ago began to notice some swelling and mild discomfort around the anus.  Reports that he did have a bulge there at that time which was approximately 2 cm in size.  He had his wife check and she states that the area was there was a definite bulge right at the anal verge and had some discoloration consistent with bruising.  I was not have any problems with bowel movements at that time but was having some discomfort at that specific area.  However they also report that at the same time of onset will he was noticing bruising into his posterior scrotum.  Since onset he states that the swelling has decreased significantly and pain has resolved.  Also reports that the bruising into the scrotum has mostly resolved though there is still some mild bruising present.  Initially he thought this may be an abscess and we scheduled appointment with general surgery.  He has been using over-the-counter medications for hemorrhoids since onset.  Denies any pain with urination.  No hematuria.  Denies any significant changes in bowel movements.  Does report longstanding history of mild constipation.    Past Medical History:   Diagnosis Date    Anxiety 2015    Duodenal ulcer     GERD (gastroesophageal reflux disease)     Few years ago    Hearing loss     Few years ago    Peripheral vascular disease     Restless legs syndrome     At least 10 years    Schatzki's ring     Umbilical hernia        Past Surgical History:   Procedure Laterality Date    COLONOSCOPY N/A 3/27/2024    COLONOSCOPY performed by Arian Miles DO at [facility] OR    INGUINAL HERNIA REPAIR Left 2015    UPPER GASTROINTESTINAL ENDOSCOPY N/A 01/2015    UPPER GASTROINTESTINAL ENDOSCOPY N/A 3/27/2024    ESOPHAGOGASTRODUODENOSCOPY BIOPSY performed by Arian Miles DO at [facility] OR       Current Outpatient Medications   Medication

## 2025-07-08 NOTE — ASSESSMENT & PLAN NOTE
Patient does have an external hemorrhoid and just based on history it seems that he probably did have thrombosis that has largely resolved at this point.  He is not having any active symptoms so I do not feel that there is any need for immediate incision and extraction of clot and I do not know that we would get much out of this just based on exam.    The bruising in the posterior scrotum is somewhat unusual for a hemorrhoid but cannot rule out that these 2 processes are related especially since they have had onset at same time and are having resolution at same time.    I discussed management options for the hemorrhoid and we discussed conservative management versus surgical excision.  I have advised that I think the best course of action is to continue to monitor for the next 2 to 3 weeks and see what becomes of the hemorrhoid see if there is any ongoing bruising or other symptoms.  As long as he has resolution to satisfaction no further workup is needed.  If he has any ongoing symptoms or wishes to have the hemorrhoid excised at that point could begin to discuss definitive surgery.  Otherwise no further follow-up with general surgery needed.   Patient is in the lateral left side position.

## (undated) DEVICE — 4-PORT MANIFOLD: Brand: NEPTUNE 2

## (undated) DEVICE — BITE BLOCK W/VELCRO STRAP

## (undated) DEVICE — CO2 CANNULA,SSOFT,ADLT,7O2,4CO2,FEMALE: Brand: MEDLINE

## (undated) DEVICE — MERCY DEFIANCE ENDO KIT: Brand: MEDLINE INDUSTRIES, INC.